# Patient Record
Sex: MALE | Race: BLACK OR AFRICAN AMERICAN | NOT HISPANIC OR LATINO | Employment: FULL TIME | URBAN - METROPOLITAN AREA
[De-identification: names, ages, dates, MRNs, and addresses within clinical notes are randomized per-mention and may not be internally consistent; named-entity substitution may affect disease eponyms.]

---

## 2020-06-25 ENCOUNTER — TELEPHONE (OUTPATIENT)
Dept: CONSULT | Facility: CLINIC | Age: 30
End: 2020-06-25

## 2020-06-25 NOTE — TELEPHONE ENCOUNTER
Received a voicemail with confusion about upcomining visit format. Clarified upcoming visit is set up at a phone visit. Sergio asked if it could be moved earlier. We agreed on 8 am> I will change appointment time

## 2020-06-26 ENCOUNTER — VIRTUAL VISIT (OUTPATIENT)
Dept: CONSULT | Facility: CLINIC | Age: 30
End: 2020-06-26
Attending: GENETIC COUNSELOR, MS
Payer: COMMERCIAL

## 2020-06-26 DIAGNOSIS — Z84.81 FAMILY HISTORY OF GENETIC DISEASE CARRIER: Primary | ICD-10-CM

## 2020-06-26 PROCEDURE — 96040 ZZH GENETIC COUNSELING, EACH 30 MINUTES: CPT | Mod: TEL,ZF | Performed by: GENETIC COUNSELOR, MS

## 2020-06-26 NOTE — PROGRESS NOTES
"Mani Zamora is a 30 year old male who is being evaluated via a billable telephone visit.      The patient has been notified of following:     \"This telephone visit will be conducted via a call between you and your physician/provider. We have found that certain health care needs can be provided without the need for a physical exam.  This service lets us provide the care you need with a short phone conversation.  If a prescription is necessary we can send it directly to your pharmacy.  If lab work is needed we can place an order for that and you can then stop by our lab to have the test done at a later time.    Telephone visits are billed at different rates depending on your insurance coverage. During this emergency period, for some insurers they may be billed the same as an in-person visit.  Please reach out to your insurance provider with any questions.    If during the course of the call the physician/provider feels a telephone visit is not appropriate, you will not be charged for this service.\"    Patient has given verbal consent for Telephone visit?  Yes    What phone number would you like to be contacted at? [phone]    How would you like to obtain your AVS? No avs    Phone call duration: 30 minutes          "

## 2020-06-26 NOTE — PROGRESS NOTES
"Mani Zamora is a 30 year old male who is being evaluated via a billable telephone visit.       The patient has been notified of following:      \"This telephone visit will be conducted via a call between you and your physician/provider. We have found that certain health care needs can be provided without the need for a physical exam.  This service lets us provide the care you need with a short phone conversation.  If a prescription is necessary we can send it directly to your pharmacy.  If lab work is needed we can place an order for that and you can then stop by our lab to have the test done at a later time.     Telephone visits are billed at different rates depending on your insurance coverage. During this emergency period, for some insurers they may be billed the same as an in-person visit.  Please reach out to your insurance provider with any questions.     If during the course of the call the physician/provider feels a telephone visit is not appropriate, you will not be charged for this service.\"     Patient has given verbal consent for Telephone visit?  Yes     What phone number would you like to be contacted at? [phone]       Phone call duration: 32 minutes      Name:  Mani Zamora  :   1990  MRN:   [id number]  Date of service: 2020  Primary Provider: No primary care provider on file.  Referring Provider: Referred Self    PRESENTING INFORMATION      Presenting information:   Mani was seen for a genetic counseling appointment to discuss family variant testing for Alport syndrome.      Pertinent Medical History:   Mani is a 30 year old male with a family history of Alport syndrome.      Mani reports being overall healthy. He donated a kidney to his mother in his mid 20s. He has a history of recurrent ear infections in adulthood requiring PE tubes. He has some hearing problems related to this but feels this has mostly improved with PE tubes. His last hearing exam was 2 years ago " but does not notice any problems in daily life. He is very active both at and outside of work.      He denies any kidney, other hearing, or ocular symptoms/concerns.    Family History:  Relevant portions are described below:       Two daughters (4y and 2y) with no kidney problems and apparently normal kidney urine screens. The youngest also has recurrent ear infections requiring PE tubes.    Sister with kidney failure diagnosed at 30y, on dialysis for past 6 years. She had dark urine as a child, multiple episodes of hematuria, and preeclampsia during pregnancy. She has not had a kidney biopsy. Genetic testing for Alport syndrome testing is pending.     Brother who has not had any nephrology work up.     Mother with kidney failure at 50y and kidney transplant at 51y. Alport syndrome, genetically confirmed. COL4A5 c.136G>T (p.Glu46*).     Nephew with Alport syndrome, genetically confirmed. COL4A5 c.136G>T (p.Glu46*). He has ESRD and hearing loss.      Discussion:   Mani wishes to pursue genetic testing for Alport syndrome. We discussed that he is at 50% risk to inherit the variant from his mother; however, his lack of symptoms and normal kidney donation work up is reassuring. We reviewed that we would only be testing for the specific variant found in his nephew Gonzalo (COL4A5 c.136G>T (p.Glu46*)). We are not sequencing/analyzing the whole gene and are not testing any other genes. We reviewed possible results including positive, and negative as well as the implications of each of these results for Mani and his children. I obtained consent, testing was ordered, and a saliva kit will be mailed to the patient's house. The sample collection and test is free of charge for Mani, but he will be billed for today's appointment.      We reviewed that Alport syndrome is a genetic disorder characterized by kidney disease, hearing loss and eye abnormalities.  About two thirds of cases of Alport syndrome exhibit X-linked  inheritance and are caused by mutations in the COL4A5 gene.  Conditions that exhibit X-linked inheritance are passed on to males through carrier females.  Males who inherit a mutation in COL4A5 will be affected as they only have one copy of this gene (only one X chromosome).  It is thought that all males with X-linked Alport syndrome develop progressive kidney disease, most develop hearing loss which may be progressive, and many develop eye abnormalities.  Females who have a mutation in one copy of COL4A5 are called carriers because they still have one working copy of COL4A5 (they have two X chromosomes).  Most female carriers of X-linked Alport syndrome present with microscopic amounts of blood in their urine (microscopic hematuria), proteinuria, and some women have more severe symptoms like renal failure.       Reviewed X linked inheritance. Females who have a pathogenic variant in COL4A5 have a 50% chance to pass on the variant to their child with each pregnancy. Sons who inherit the variant from their mother will have Alport syndrome, and daughters who inherit the variant from their mother can have a range of features (see above). Males have a 50% chance of passing on their X chromosome with the variant, and a 50% chance of passing on their Y chromosome without the variant. Thus, males who have a pathogenic variant in COL4A5 cannot pass the variant to a son because it is inherited through the X chromosome, but all their daughters will inherit the variant can have a range of features (see above).        Plan:  1. Family variant testing through [employer] was ordered through the [employer] portal and an order was placed in [facility] so results can be entered in our system once resulted.  2. I will call with results whether positive or negative. Return and follow up pending results of above testing  3. Contact information was provided should any questions arise in the future.       Anu Chavez MS, [facility]  Licensed &  Certified Genetic Counselor   [facility]  Phone: [phone]  Fax: [phone]        Approximate Time Spent in Consultation: 30 minutes      CC: no letter

## 2020-06-26 NOTE — Clinical Note
"  6/26/2020      RE: Mani Zamora  [address] [place] [address]       Mani Zamora is a 30 year old male who is being evaluated via a billable telephone visit.      The patient has been notified of following:     \"This telephone visit will be conducted via a call between you and your physician/provider. We have found that certain health care needs can be provided without the need for a physical exam.  This service lets us provide the care you need with a short phone conversation.  If a prescription is necessary we can send it directly to your pharmacy.  If lab work is needed we can place an order for that and you can then stop by our lab to have the test done at a later time.    Telephone visits are billed at different rates depending on your insurance coverage. During this emergency period, for some insurers they may be billed the same as an in-person visit.  Please reach out to your insurance provider with any questions.    If during the course of the call the physician/provider feels a telephone visit is not appropriate, you will not be charged for this service.\"    Patient has given verbal consent for Telephone visit?  Yes    What phone number would you like to be contacted at? [phone]    How would you like to obtain your AVS? {AVS Preference:[id number]}    Phone call duration: *** minutes    {signature options:[id number]}        ***    Anu Chavez GC"

## 2020-07-08 DIAGNOSIS — Z84.81 FAMILY HISTORY OF GENETIC DISEASE CARRIER: ICD-10-CM

## 2020-07-21 ENCOUNTER — TELEPHONE (OUTPATIENT)
Dept: CONSULT | Facility: CLINIC | Age: 30
End: 2020-07-21

## 2020-07-21 NOTE — TELEPHONE ENCOUNTER
Spoke with Mani regarding his test results.     Reviewed genetic testing technology and results for the next generation sequencing gene panel.    Mani's testing looked specifically at the known familial variant in COL4A5 (c.136G>T (p.Glu46*)). According to [facility], the result of Mani's known familial variant testing was normal/negative.     This is consistent with a negative genetic test result, which means that we did not find the known familial pathogenic variant known to be associated with Alport syndrome. This means that Mani does not have Alport syndrome caused by this variant.     Since he does not have the variant Mani cannot pass the variant on to his children and they do not need to be tested. We also discussed that the kidney he donated to his affected mother would most likely not have contained the known familial variant.     Mani was relieved to hear these results.     We discussed that this genetic testing is not comprehensive. If he or his daughters experience medical problems (including kidney symptoms) they should see a doctor and further testing may be warranted.    No follow up recommended based on this result. Report will be released in [facility] patient portal.     Family requested a mailed copy of the test report. All questions were answered.

## 2020-07-22 LAB
LAB SCANNED RESULT: ABNORMAL
MISCELLANEOUS TEST: NORMAL

## 2020-08-25 ENCOUNTER — OFFICE VISIT (OUTPATIENT)
Dept: URGENT CARE | Facility: URGENT CARE | Age: 30
End: 2020-08-25
Payer: COMMERCIAL

## 2020-08-25 VITALS
OXYGEN SATURATION: 97 % | TEMPERATURE: 98.7 F | WEIGHT: 242.2 LBS | DIASTOLIC BLOOD PRESSURE: 89 MMHG | SYSTOLIC BLOOD PRESSURE: 148 MMHG | HEART RATE: 78 BPM | BODY MASS INDEX: 31.1 KG/M2 | RESPIRATION RATE: 20 BRPM

## 2020-08-25 DIAGNOSIS — L23.7 CONTACT DERMATITIS DUE TO POISON IVY: Primary | ICD-10-CM

## 2020-08-25 PROCEDURE — 99203 OFFICE O/P NEW LOW 30 MIN: CPT | Performed by: NURSE PRACTITIONER

## 2020-08-25 RX ORDER — PREDNISONE 20 MG/1
20 TABLET ORAL 2 TIMES DAILY
Qty: 10 TABLET | Refills: 0 | Status: SHIPPED | OUTPATIENT
Start: 2020-08-25 | End: 2020-08-30

## 2020-08-25 RX ORDER — HYDROXYZINE HYDROCHLORIDE 25 MG/1
25 TABLET, FILM COATED ORAL EVERY 8 HOURS PRN
Qty: 21 TABLET | Refills: 0 | Status: SHIPPED | OUTPATIENT
Start: 2020-08-25 | End: 2020-09-01

## 2020-08-25 ASSESSMENT — ENCOUNTER SYMPTOMS
SHORTNESS OF BREATH: 0
NAUSEA: 0
DIARRHEA: 0
COUGH: 0
CHILLS: 0
RHINORRHEA: 0
DIAPHORESIS: 0
VOMITING: 0
SORE THROAT: 0
FEVER: 0

## 2020-08-26 NOTE — PATIENT INSTRUCTIONS
Patient Education     Avoiding Poison Ivy, Poison Oak, and Poison Sumac  Poison oak, poison ivy, and poison sumac are plants that can cause skin rashes. The problem is a sap oil called urushiol that is contained in these plants. If you're allergic to urushiol, touching one of these plants may cause your skin to react. Within hours or days, you may have a red, swollen, itchy rash. You can't stop the rash. But you can soothe the itching.        Recognizing these plants  You can help prevent a poison oak, poison ivy, or poison sumac rash. Know what these plants look like. And then avoid them. They grow in the form of carrie, small plants, and large bushes. In most cases, poison oak and poison ivy have 3 leaves per stem. Poison sumac has from 7 leaves to 13 leaves per stem. Watch out for these plants when you go to any outdoor area, from a friend's overgrown back yard to the [facility]. Urushiol is present in these plants all year round, even when the leaves are gone. So always be on the lookout.  What causes a reaction?  Poison oak, poison ivy, and poison sumac thrive mainly in unmaintained outdoor areas. If you touch these plants, you may get a rash. You may also react if you touch something that came in contact with urushiol. This could be a dog or cat, clothing, or equipment. But the rash caused by these plants is not contagious.  Steps to prevention  When heading outdoors, take these preventive steps:    Avoid touching any of these plants.    Wear long pants and a long-sleeved shirt.    If you're going to a heavily wooded or brushy area, also put on gloves, a hat, and boots.    If you are very sensitive, apply bentoquatam 5% topical cream to all exposed areas of your skin. This creates a layer of protection between your skin and any sap oil you may touch.    If you come in contact with these plants or the oil, wash with soap and water as soon as possible.    Wash clothing and animals that come in contact with  these plants as well. Urushiol may stay on them and cause a rash when you touch them in the future.  Date Last Reviewed: 3/1/2017    1022-5778 [employer]. [address] [place] [address], [place] [address]. All rights reserved. This information is not intended as a substitute for professional medical care. Always follow your healthcare professional's instructions.           Patient Education     Managing a Poison Ivy, Poison Oak, or Poison Sumac Reaction  If you come in contact with urushiol    If you think you may have come in contact with the sap oil (called urushiol) contained in poison ivy, poison oak, or poison sumac plants, wash the affected part of your skin. Do this within 15 minutes after contact. Use water or preferably, soap and water.  Undress, and wash your clothes and gear as soon as you can. Be sure to wash any pet that was with you. Taking these steps can help prevent spreading sap oil to someone else. If you have a rash, but are not sure if it is from one of these plants, see your healthcare provider.  To soothe the itching  Your skin may react to poison oak, poison ivy, and poison sumac within hours to a few days after contact. Once you have come into contact with these plants, you can t stop the reaction. But you can take these steps to soothe the itching:    Don t scratch or scrub your rash. Not even if the itching is severe. Scratching can lead to infection.    Bathe in lukewarm (not hot) water. Or take short cool showers to relieve the itching. For a more soothing bath, add oatmeal to the water.    Use antihistamines that are taken by mouth. These include diphenhydramine. You can buy these at the pharmacy. Talk to your healthcare provider or pharmacist for more information on oral antihistamines.    Use over-the-counter treatments on your skin. These include cortisone and calamine lotion.  How your skin may react  A mild rash may become red, swollen, and itchy. The rash may form a line on  your skin where you brushed against the plant. If you have a severe rash, your itching may worsen. And your rash may blister and ooze. If this happens, seek medical care. The fluid from your blisters will not make your rash spread. With or without medical care, your rash may last up to 3 weeks. In the future, try to avoid coming in contact with these plants.  When to call your healthcare provider  Call your healthcare provider if:    Your rash is severe    The rash spreads beyond the exposed part of your body or affects your face.    The rash does not clear up within a few weeks  You may be given medicine to take by mouth or apply directly on the skin.     Call 911  Call 911 if you have any of the following:    Trouble breathing or swallowing    Any significant swelling   Date Last Reviewed: 3/1/2017    6775-7844 [employer]. [address] [place] [address]. All rights reserved. This information is not intended as a substitute for professional medical care. Always follow your healthcare professional's instructions.

## 2020-08-26 NOTE — PROGRESS NOTES
SUBJECTIVE:   Mani Zamora is a 30 year old male presenting with a chief complaint of   Chief Complaint   Patient presents with     Derm Problem     Possible poison ivy x1 week       He is a new patient of [facility].    Rash  Onset of rash was 1 weeks ago. He was exposed to Poison Ivy  Location of the rash: arms, buttocks, legs.  Associated symptoms include: itching and redness.  Symptoms appear to be improving.  Therapies tried to improve the rash: : none.  Previous history of a similar rash? No  Recent exposure history: poison ivy while clearing a property up [place]    Review of Systems   Constitutional: Negative for chills, diaphoresis and fever.   HENT: Negative for congestion, ear pain, rhinorrhea and sore throat.    Respiratory: Negative for cough and shortness of breath.    Gastrointestinal: Negative for diarrhea, nausea and vomiting.   Skin: Positive for rash.   All other systems reviewed and are negative.      History reviewed. No pertinent past medical history.  History reviewed. No pertinent family history.  Current Outpatient Medications   Medication Sig Dispense Refill     betamethasone valerate (VALISONE) 0.1 % external ointment Apply topically 2 times daily 45 g 0     hydrOXYzine (ATARAX) 25 MG tablet Take 1 tablet (25 mg) by mouth every 8 hours as needed for itching 21 tablet 0     predniSONE (DELTASONE) 20 MG tablet Take 1 tablet (20 mg) by mouth 2 times daily for 5 days 10 tablet 0     NO ACTIVE MEDICATIONS        Social History     Tobacco Use     Smoking status: Current Every Day Smoker     Smokeless tobacco: Former User     Types: Chew   Substance Use Topics     Alcohol use: Yes     Comment: occasional       OBJECTIVE  BP (!) 148/89   Pulse 78   Temp 98.7  F (37.1  C) (Tympanic)   Resp 20   Wt 109.9 kg (242 lb 3.2 oz)   SpO2 97%   BMI 31.10 kg/m      Physical Exam  Vitals signs and nursing note reviewed.   Constitutional:       General: He is not in acute distress.     Appearance: He is  well-developed. He is not diaphoretic.   HENT:      Head: Normocephalic and atraumatic.      Right Ear: Tympanic membrane and external ear normal.      Left Ear: Tympanic membrane and external ear normal.   Eyes:      Pupils: Pupils are equal, round, and reactive to light.   Neck:      Musculoskeletal: Normal range of motion and neck supple.   Pulmonary:      Effort: Pulmonary effort is normal. No respiratory distress.      Breath sounds: Normal breath sounds.   Lymphadenopathy:      Cervical: No cervical adenopathy.   Skin:     General: Skin is warm and dry.      Comments: Examination of the rash reveals multiple clusters of well-defined erythematous vesicles with clear drainage on arms, legs and right buttocks.   Neurological:      Mental Status: He is alert.      Cranial Nerves: No cranial nerve deficit.         ASSESSMENT:      ICD-10-CM    1. Contact dermatitis due to poison ivy  L23.7 betamethasone valerate (VALISONE) 0.1 % external ointment     predniSONE (DELTASONE) 20 MG tablet     hydrOXYzine (ATARAX) 25 MG tablet        PLAN:  Discussed symptoms due to poison ivy, Advised to avoid, high potency corticosteroid, small dose prednisone.   Antihistamine as advised  Side effects of medications discussed  Follow up with PCP if symptoms are persisting in 3 days or sooner if getting worse.   Questions are answered and patient is in agreement with treatment plan.      Patient Instructions       Patient Education     Avoiding Poison Ivy, Poison Oak, and Poison Sumac  Poison oak, poison ivy, and poison sumac are plants that can cause skin rashes. The problem is a sap oil called urushiol that is contained in these plants. If you're allergic to urushiol, touching one of these plants may cause your skin to react. Within hours or days, you may have a red, swollen, itchy rash. You can't stop the rash. But you can soothe the itching.        Recognizing these plants  You can help prevent a poison oak, poison ivy, or poison  sumac rash. Know what these plants look like. And then avoid them. They grow in the form of carrie, small plants, and large bushes. In most cases, poison oak and poison ivy have 3 leaves per stem. Poison sumac has from 7 leaves to 13 leaves per stem. Watch out for these plants when you go to any outdoor area, from a friend's overgrown back yard to the wilderness. Urushiol is present in these plants all year round, even when the leaves are gone. So always be on the lookout.  What causes a reaction?  Poison oak, poison ivy, and poison sumac thrive mainly in unmaintained outdoor areas. If you touch these plants, you may get a rash. You may also react if you touch something that came in contact with urushiol. This could be a dog or cat, clothing, or equipment. But the rash caused by these plants is not contagious.  Steps to prevention  When heading outdoors, take these preventive steps:    Avoid touching any of these plants.    Wear long pants and a long-sleeved shirt.    If you're going to a heavily wooded or brushy area, also put on gloves, a hat, and boots.    If you are very sensitive, apply bentoquatam 5% topical cream to all exposed areas of your skin. This creates a layer of protection between your skin and any sap oil you may touch.    If you come in contact with these plants or the oil, wash with soap and water as soon as possible.    Wash clothing and animals that come in contact with these plants as well. Urushiol may stay on them and cause a rash when you touch them in the future.  Date Last Reviewed: 3/1/2017    7642-2034 [employer]. [address] [place] [address]. All rights reserved. This information is not intended as a substitute for professional medical care. Always follow your healthcare professional's instructions.           Patient Education     Managing a Poison Ivy, Poison Oak, or Poison Sumac Reaction  If you come in contact with urushiol    If you think you may have come  in contact with the sap oil (called urushiol) contained in poison ivy, poison oak, or poison sumac plants, wash the affected part of your skin. Do this within 15 minutes after contact. Use water or preferably, soap and water.  Undress, and wash your clothes and gear as soon as you can. Be sure to wash any pet that was with you. Taking these steps can help prevent spreading sap oil to someone else. If you have a rash, but are not sure if it is from one of these plants, see your healthcare provider.  To soothe the itching  Your skin may react to poison oak, poison ivy, and poison sumac within hours to a few days after contact. Once you have come into contact with these plants, you can t stop the reaction. But you can take these steps to soothe the itching:    Don t scratch or scrub your rash. Not even if the itching is severe. Scratching can lead to infection.    Bathe in lukewarm (not hot) water. Or take short cool showers to relieve the itching. For a more soothing bath, add oatmeal to the water.    Use antihistamines that are taken by mouth. These include diphenhydramine. You can buy these at the pharmacy. Talk to your healthcare provider or pharmacist for more information on oral antihistamines.    Use over-the-counter treatments on your skin. These include cortisone and calamine lotion.  How your skin may react  A mild rash may become red, swollen, and itchy. The rash may form a line on your skin where you brushed against the plant. If you have a severe rash, your itching may worsen. And your rash may blister and ooze. If this happens, seek medical care. The fluid from your blisters will not make your rash spread. With or without medical care, your rash may last up to 3 weeks. In the future, try to avoid coming in contact with these plants.  When to call your healthcare provider  Call your healthcare provider if:    Your rash is severe    The rash spreads beyond the exposed part of your body or affects your  face.    The rash does not clear up within a few weeks  You may be given medicine to take by mouth or apply directly on the skin.     Call 911  Call 911 if you have any of the following:    Trouble breathing or swallowing    Any significant swelling   Date Last Reviewed: 3/1/2017    0709-8481 [employer]. [address] [place] [address], [place] [address]. All rights reserved. This information is not intended as a substitute for professional medical care. Always follow your healthcare professional's instructions.

## 2022-04-11 ENCOUNTER — OFFICE VISIT (OUTPATIENT)
Dept: URGENT CARE | Facility: URGENT CARE | Age: 32
End: 2022-04-11
Payer: COMMERCIAL

## 2022-04-11 VITALS
TEMPERATURE: 98.4 F | HEART RATE: 77 BPM | SYSTOLIC BLOOD PRESSURE: 134 MMHG | DIASTOLIC BLOOD PRESSURE: 88 MMHG | OXYGEN SATURATION: 99 % | RESPIRATION RATE: 16 BRPM | WEIGHT: 242.8 LBS | BODY MASS INDEX: 31.17 KG/M2

## 2022-04-11 DIAGNOSIS — S20.211A CONTUSION OF RIB ON RIGHT SIDE, INITIAL ENCOUNTER: Primary | ICD-10-CM

## 2022-04-11 DIAGNOSIS — R03.0 ELEVATED BLOOD PRESSURE READING WITHOUT DIAGNOSIS OF HYPERTENSION: ICD-10-CM

## 2022-04-11 PROCEDURE — 99213 OFFICE O/P EST LOW 20 MIN: CPT | Performed by: EMERGENCY MEDICINE

## 2022-04-11 RX ORDER — LIDOCAINE 4 G/G
1 PATCH TOPICAL EVERY 24 HOURS
Qty: 10 PATCH | Refills: 0 | Status: SHIPPED | OUTPATIENT
Start: 2022-04-11

## 2022-04-11 RX ORDER — LIDOCAINE 4 G/G
1 PATCH TOPICAL EVERY 24 HOURS
Qty: 10 PATCH | Refills: 0 | Status: SHIPPED | OUTPATIENT
Start: 2022-04-11 | End: 2022-04-11

## 2022-04-11 ASSESSMENT — PAIN SCALES - GENERAL: PAINLEVEL: MODERATE PAIN (4)

## 2023-03-07 ENCOUNTER — E-VISIT (OUTPATIENT)
Dept: URGENT CARE | Facility: CLINIC | Age: 33
End: 2023-03-07

## 2023-03-07 ENCOUNTER — OFFICE VISIT (OUTPATIENT)
Dept: URGENT CARE | Facility: URGENT CARE | Age: 33
End: 2023-03-07
Payer: COMMERCIAL

## 2023-03-07 VITALS
HEART RATE: 65 BPM | DIASTOLIC BLOOD PRESSURE: 99 MMHG | OXYGEN SATURATION: 98 % | WEIGHT: 252.8 LBS | SYSTOLIC BLOOD PRESSURE: 153 MMHG | TEMPERATURE: 98.1 F | BODY MASS INDEX: 32.46 KG/M2

## 2023-03-07 DIAGNOSIS — R30.0 DYSURIA: Primary | ICD-10-CM

## 2023-03-07 DIAGNOSIS — G89.29 CHRONIC BILATERAL LOW BACK PAIN WITHOUT SCIATICA: Primary | ICD-10-CM

## 2023-03-07 DIAGNOSIS — M54.50 CHRONIC BILATERAL LOW BACK PAIN WITHOUT SCIATICA: Primary | ICD-10-CM

## 2023-03-07 PROCEDURE — 99214 OFFICE O/P EST MOD 30 MIN: CPT | Performed by: PHYSICIAN ASSISTANT

## 2023-03-07 PROCEDURE — 99207 PR NON-BILLABLE SERV PER CHARTING: CPT | Performed by: PREVENTIVE MEDICINE

## 2023-03-07 RX ORDER — PREDNISONE 20 MG/1
40 TABLET ORAL DAILY
Qty: 14 TABLET | Refills: 0 | Status: SHIPPED | OUTPATIENT
Start: 2023-03-07 | End: 2023-03-14

## 2023-03-07 RX ORDER — METHOCARBAMOL 500 MG/1
500 TABLET, FILM COATED ORAL 4 TIMES DAILY PRN
Qty: 30 TABLET | Refills: 0 | Status: SHIPPED | OUTPATIENT
Start: 2023-03-07

## 2023-03-07 ASSESSMENT — ENCOUNTER SYMPTOMS
NECK STIFFNESS: 0
FEVER: 0
JOINT SWELLING: 0
NECK PAIN: 0
FATIGUE: 0
COLOR CHANGE: 0
WOUND: 0
MYALGIAS: 1
BACK PAIN: 1
SHORTNESS OF BREATH: 0
CHEST TIGHTNESS: 0
WHEEZING: 0
CONSTITUTIONAL NEGATIVE: 1
CHILLS: 0
COUGH: 0
PALPITATIONS: 0
ARTHRALGIAS: 1

## 2023-03-07 NOTE — PATIENT INSTRUCTIONS
Dealeonora Zamora,    We are sorry you are not feeling well. Based on the responses you provided, it is recommended that you be seen in-person in urgent care so we can better evaluate your symptoms. Please click here to find the nearest urgent care location to you.   You will not be charged for this Visit. Thank you for trusting us with your care.    Manuel Wade MD, MD      Dysuria with Uncertain Cause (Adult)    The urethra is the tube that allows urine to pass out of the body. In a woman, the urethra is the opening above the vagina. In men, the urethra is the opening on the tip of the penis. Dysuria is the feeling of pain or burning in the urethra when passing urine.   Dysuria can be caused by anything that irritates or inflames the urethra. An infection or chemical irritation can cause this reaction. A bladder infection is the most common cause of dysuria in adults. A urine test can diagnose this. A bladder infection needs antibiotic treatment.   Soaps, lotions, colognes, and feminine hygiene products can cause dysuria. So can birth control jellies, creams, and foams. It will go away 1 to 3 days after using these irritants.   Sexually transmitted infections (STIs) such as chlamydia or gonorrhea can cause dysuria. Your healthcare provider may take a culture sample. Your provider may start you on antibiotic medicine before the culture test returns.   In women who have gone through menopause, dysuria can be from dryness in the lining of the urethra. This can be treated with hormones. Dysuria becomes long-term (chronic) when it lasts for weeks or months. You may need to see a specialist (urologist) to diagnose and treat chronic dysuria.   Home care  These home care tips may help:    Don't use any chemicals or products that you think may be causing your symptoms.    If you were given a prescription medicine, take as directed. Take it until it is all used up.    If a culture was taken, don't  have sex until you have been told that it is negative. A negative culture means you don't have an infection. Then follow your healthcare provider's advice to treat your condition.  If a culture was done and it is positive:     Both you and your sexual partner may need to be treated. This is true even if your partner has no symptoms.    Contact your healthcare provider or go to an urgent care clinic or the public health department to be looked at and treated.    Don't have sex until both you and your partner have finished all antibiotics and your healthcare provider says you are no longer contagious.    Learn about and use safe sex practices. The safest sex is with a partner who has tested negative and only has sex with you. Condoms can prevent STIs from spreading, but they aren't a guarantee.  Follow-up care  Follow up with your healthcare provider, or as advised. If a culture was taken, you may call as directed for the results. If you have an STI, follow up with your provider or the public health department for a complete STI screening, including HIV testing. For more information, contact CDC-INFO at 354-197-4958.   When to get medical advice  Call your healthcare provider right away if any of these occur:    You aren't better after 3 days of treatment    Fever of 100.4 F (38 C) or higher, or as directed by your provider    Back or belly pain that gets worse    You can't urinate because of pain    New discharge from the urethra, vagina, or penis    Painful sores on the penis    Rash or joint pain    Painful lumps (lymph nodes) in the groin    Testicle pain or swelling of the scrotum  [employer] last reviewed this educational content on 10/1/2019    3563-1812 [employer]. All rights reserved. This information is not intended as a substitute for professional medical care. Always follow your healthcare professional's instructions.

## 2023-03-07 NOTE — PROGRESS NOTES
Isiah Singleton is a 32 year old, presenting for the following health issues:  Back Pain (Sore back, daughter jumped off couch and onto back. )    HPI   Back Pain  Onset/Duration: 7days  Description:   Location of pain: bilateral low back  Character of pain: dull, soreness  Pain radiation: none  New numbness or weakness in legs, not attributed to pain: No weakness, numbness, tingling.  No bladder or bowel dysfunction.  No swelling, redness, drainage or fevers.    Intensity: mild  Progression of Symptoms: same  History:   Specific cause: His 3yo daughter jumped on him 1week ago  Pain interferes with job: Yes  History of back problems: Yes, reports hx of chronic back pain and has seen specialist for this  Any previous MRI or X-rays: Yes  Sees a specialist for back pain: Not currently  Alleviating factors:   Improved by: rest, sitting    Precipitating factors:  Worsened by: Lifting, Bending, changes in positions  Therapies tried and outcome: acetaminophen (Tylenol), heat, rest and sitting with minimal relief  Accompanying Signs & Symptoms:  Risk of Fracture: None  Risk of Cauda Equina: None  Risk of Infection: None  Risk of Cancer: None  Risk of Ankylosing Spondylitis: Onset at age <35, male, AND morning back stiffness  no     Patient Active Problem List   Diagnosis     Kidney donor     Acute right otitis media     Current Outpatient Medications   Medication     betamethasone valerate (VALISONE) 0.1 % external ointment     Lidocaine (LIDOCARE) 4 % Patch     NO ACTIVE MEDICATIONS     No current facility-administered medications for this visit.      No Known Allergies     Review of Systems   Constitutional: Negative.  Negative for chills, fatigue and fever.   Respiratory: Negative for cough, chest tightness, shortness of breath and wheezing.    Cardiovascular: Negative for chest pain, palpitations and peripheral edema.   Musculoskeletal: Positive for arthralgias, back pain and myalgias. Negative for gait problem,  joint swelling, neck pain and neck stiffness.   Skin: Negative.  Negative for color change, pallor, rash and wound.   All other systems reviewed and are negative.           Objective    BP (!) 153/99 (BP Location: Left arm, Patient Position: Sitting, Cuff Size: Adult Large)   Pulse 65   Temp 98.1  F (36.7  C) (Tympanic)   Wt 114.7 kg (252 lb 12.8 oz)   SpO2 98%   BMI 32.46 kg/m    Body mass index is 32.46 kg/m .  Physical Exam  Vitals and nursing note reviewed.   Constitutional:       General: He is not in acute distress.     Appearance: Normal appearance. He is well-developed and normal weight. He is not ill-appearing.   Abdominal:      General: Abdomen is flat. Bowel sounds are normal.      Palpations: Abdomen is soft. There is no mass.      Tenderness: There is no abdominal tenderness. There is no right CVA tenderness, left CVA tenderness, guarding or rebound. Negative signs include Bennett's sign and McBurney's sign.      Hernia: No hernia is present.   Musculoskeletal:      Lumbar back: Spasms and tenderness present. No swelling, edema, deformity, lacerations or bony tenderness. Normal range of motion. Negative right straight leg raise test and negative left straight leg raise test. No scoliosis.   Skin:     General: Skin is warm and dry.      Capillary Refill: Capillary refill takes less than 2 seconds.   Neurological:      Mental Status: He is alert and oriented to person, place, and time.      Sensory: Sensation is intact. No sensory deficit.      Motor: Motor function is intact.      Gait: Gait normal.      Deep Tendon Reflexes: Reflexes are normal and symmetric.   Psychiatric:         Mood and Affect: Mood normal.         Behavior: Behavior normal.         Thought Content: Thought content normal.         Judgment: Judgment normal.          Assessment/Plan:  Chronic bilateral low back pain without sciatica:   He declined xrays. Most likely strain/sprain/spasm vs pinched nerve.  Recommend RICE and will  give [id number], methocarbamol and voltaren gel prn pain.  Discussed risks and benefits of medication along with side effects, direction for use.  No driving or operating machinery due to sedation.  Will also send to orthopedics for further evaluation and management.  Recheck in clinic if symptoms worsen or if symptoms do not improve.   -     predniSONE (DELTASONE) 20 MG tablet; Take 2 tablets (40 mg) by mouth daily for 7 days  -     methocarbamol (ROBAXIN) 500 MG tablet; Take 1 tablet (500 mg) by mouth 4 times daily as needed for muscle spasms  -     diclofenac (VOLTAREN) 1 % topical gel; Apply 2 g topically 4 times daily  -     Spine  Referral        Megan Harkins PA-C

## 2023-04-09 ENCOUNTER — HEALTH MAINTENANCE LETTER (OUTPATIENT)
Age: 33
End: 2023-04-09

## 2024-06-15 ENCOUNTER — HEALTH MAINTENANCE LETTER (OUTPATIENT)
Age: 34
End: 2024-06-15

## 2025-07-06 ENCOUNTER — HEALTH MAINTENANCE LETTER (OUTPATIENT)
Age: 35
End: 2025-07-06